# Patient Record
(demographics unavailable — no encounter records)

---

## 2025-02-17 NOTE — HISTORY OF PRESENT ILLNESS
[FreeTextEntry1] : np cpe [de-identified] : NP CPE     as above, +ve FAST no CP/SOB c activity no dizziness no palpitations  no N/V/D +ve BM qd NL no bloody/black stools  no urinary complaints    pt reports 2M Hx of  L inguinal hernia "that I can push back in."   pt has stopped ETOH since Summer 2024

## 2025-02-17 NOTE — PLAN
[FreeTextEntry1] : EKG performed: SR at 61 bpm, normal axis, no ST/T changes   see lab orders  see referral x 2   f/u 2 weeks for BP check

## 2025-02-17 NOTE — PHYSICAL EXAM

## 2025-02-21 NOTE — PHYSICAL EXAM
[Normal Thyroid] : the thyroid was normal [No Rash or Lesion] : No rash or lesion [Alert] : alert [Oriented to Person] : oriented to person [Oriented to Place] : oriented to place [Oriented to Time] : oriented to time [Calm] : calm [JVD] : no jugular venous distention  [de-identified] :  No acute distress [de-identified] :  No respiratory distress [de-identified] :  Regular rate [de-identified] : soft, nontender. no rebound or guarding. [de-identified] :  normal range of motion palpable left inguinal  hernia - reducible, mild tenderness, no overlying skin changes

## 2025-02-21 NOTE — CONSULT LETTER
[Dear  ___] : Dear ~THEO, [Consult Letter:] : I had the pleasure of evaluating your patient, [unfilled]. [Please see my note below.] : Please see my note below. [Consult Closing:] : Thank you very much for allowing me to participate in the care of this patient.  If you have any questions, please do not hesitate to contact me. [Sincerely,] : Sincerely, [FreeTextEntry3] : Patrick Hester MD, FACS Director of Bariatric Surgery [facility] [place] [facility]  Assistant Professor of Surgery  [employer] [facility] [place]

## 2025-02-21 NOTE — ASSESSMENT
[FreeTextEntry1] :  Mr. YORK is a 59 year old man with left inguinal hernia. We discussed the options of robotic/laparoscopic repair, open repair, and nonoperative management. The risks/benefits and alternatives were discussed at length and all questions were answered. We discussed the risks and benefits of the use of mesh. We discussed the risks/benefits of performing bilateral repair in the event that a contralateral hernia defect is seen intraoperatively. The patient appears to understand and wishes to proceed with robotic left inguinal hernia repair with mesh, possible bilateral inguinal hernia repair with mesh.

## 2025-02-21 NOTE — PLAN
[FreeTextEntry1] : Plan for robotic left inguinal hernia repair with mesh, possible bilateral inguinal hernia repair with mesh.   The patient understands and agrees that if he experiences pain, fever/chills/nausea/emesis or other symptoms  he will contact the office or present to the ED immediately.   I, Dr. Hester personally performed the evaluation and management (E/M) services for this new patient. That E/M includes conducting the initial examination, assessing all conditions, and establishing the plan of care. Today, my NP, Deana Dyson, was here to observe my evaluation and management services for this patient to be followed going forward.

## 2025-03-04 NOTE — HISTORY OF PRESENT ILLNESS
[No Pertinent Cardiac History] : no history of aortic stenosis, atrial fibrillation, coronary artery disease, recent myocardial infarction, or implantable device/pacemaker [Aortic Stenosis] : no aortic stenosis [Coronary Artery Disease] : no coronary artery disease [Recent Myocardial Infarction] : no recent myocardial infarction [Implantable Device/Pacemaker] : no implantable device/pacemaker [No Pertinent Pulmonary History] : no history of asthma, COPD, sleep apnea, or smoking [Asthma] : no asthma [COPD] : no COPD [Sleep Apnea] : no sleep apnea [Smoker] : not a smoker [No Adverse Anesthesia Reaction] : no adverse anesthesia reaction in self or family member [Chronic Anticoagulation] : no chronic anticoagulation [Chronic Kidney Disease] : no chronic kidney disease [Diabetes] : no diabetes [(Patient denies any chest pain, claudication, dyspnea on exertion, orthopnea, palpitations or syncope)] : Patient denies any chest pain, claudication, dyspnea on exertion, orthopnea, palpitations or syncope [FreeTextEntry1] : ROBOTIC LEFT IHR WITH MESH POSSIBLE BL IHR [FreeTextEntry2] : 03/05/2025 [FreeTextEntry3] : Patrick Nichols [FreeTextEntry4] : 58 yo male for medical clearance.  Scheduled for robotic L inguinal hernia repair with mesh possible B/L inguinal hernia repair c mesh at [facility] c Dr. Hester.   Pt feels well today    no CP/SOB c activity no dizziness no  palpitations no N/V/D +BM qd NL no bloody/black stools  no acute change in bowel  habits no urinary complaints    NO Hx of adverse reaction to anesthesia  NO allergy to shellfish/latex/iodine  NO hx of Blood Transfusion  ABO Blood Type=  NO dentures  [FreeTextEntry7] : EGK performed on 2/17/25:  SR at 61 bpm, normal axis, no ST/T changes   INR= 1.2

## 2025-03-04 NOTE — PHYSICAL EXAM

## 2025-03-21 NOTE — HISTORY OF PRESENT ILLNESS
[de-identified] : Mr. YORK is a 59 year old man status post robotic left inguinal hernia repair with mesh who comes in today for postop visit. He is doing well. Denies pain. Denies fever/chills. No redness or pain at incision sites. Tolerating diet. Normal bowel movements.

## 2025-03-21 NOTE — PHYSICAL EXAM
[de-identified] : No acute distress [de-identified] : soft, nontender. no rebound or guarding. incisions c/d/i

## 2025-03-21 NOTE — ASSESSMENT
[FreeTextEntry1] : Mr. YORK is a 59 year old man status post robotic left inguinal hernia repair with mesh, doing well